# Patient Record
Sex: FEMALE | Race: WHITE | NOT HISPANIC OR LATINO | URBAN - METROPOLITAN AREA
[De-identification: names, ages, dates, MRNs, and addresses within clinical notes are randomized per-mention and may not be internally consistent; named-entity substitution may affect disease eponyms.]

---

## 2024-08-13 ENCOUNTER — OFFICE VISIT (OUTPATIENT)
Dept: CARDIOLOGY | Facility: HOSPITAL | Age: 24
End: 2024-08-13
Payer: COMMERCIAL

## 2024-08-13 VITALS
HEART RATE: 72 BPM | OXYGEN SATURATION: 100 % | DIASTOLIC BLOOD PRESSURE: 81 MMHG | WEIGHT: 166.23 LBS | SYSTOLIC BLOOD PRESSURE: 122 MMHG | BODY MASS INDEX: 27.66 KG/M2

## 2024-08-13 DIAGNOSIS — R00.2 PALPITATIONS: ICD-10-CM

## 2024-08-13 DIAGNOSIS — E78.5 HYPERLIPIDEMIA, UNSPECIFIED HYPERLIPIDEMIA TYPE: Primary | ICD-10-CM

## 2024-08-13 LAB
ATRIAL RATE: 74 BPM
P AXIS: 84 DEGREES
P OFFSET: 197 MS
P ONSET: 150 MS
PR INTERVAL: 142 MS
Q ONSET: 221 MS
QRS COUNT: 13 BEATS
QRS DURATION: 86 MS
QT INTERVAL: 368 MS
QTC CALCULATION(BAZETT): 408 MS
QTC FREDERICIA: 394 MS
R AXIS: 82 DEGREES
T AXIS: 60 DEGREES
T OFFSET: 405 MS
VENTRICULAR RATE: 74 BPM

## 2024-08-13 PROCEDURE — 99204 OFFICE O/P NEW MOD 45 MIN: CPT | Performed by: NURSE PRACTITIONER

## 2024-08-13 PROCEDURE — 99214 OFFICE O/P EST MOD 30 MIN: CPT | Performed by: NURSE PRACTITIONER

## 2024-08-13 PROCEDURE — 93005 ELECTROCARDIOGRAM TRACING: CPT | Performed by: NURSE PRACTITIONER

## 2024-08-14 NOTE — PROGRESS NOTES
Referred by Dr. Celaya for palpitations and h/o dyslipidemia      History Of Present Illness:    Kristina Castillo is a 24 y.o. female with no significant past medical history presenting to the [facility] for evaluation of palpitations as well as evaluation of hyperlipidemia.  She reports having intermittent palpitations for many years.  Feels as if her heart stops for a second and restarts.  She denies associated dizziness, SOB, chest pain.  Symptoms occur once or twice weekly and last for a few seconds.  Was diagnosed with hyperlipidemia a few years ago-- was taking statin therapy but stopped.  Lipid panel 9/7/2021-- total 211, HDL 70.6, , Trig 130.       Past Medical History:  She has a past medical history of Persons encountering health services in other specified circumstances.    Past Surgical History:  She has a past surgical history that includes Other surgical history (12/17/2020).      Social History:  She has no history on file for tobacco use, alcohol use, and drug use.    Family History:  No family history on file.     Allergies:  Penicillins    Outpatient Medications:  No current outpatient medications     Last Recorded Vitals:  Vitals:    08/13/24 1451   BP: 122/81   Pulse: 72   SpO2: 100%   Weight: 75.4 kg (166 lb 3.6 oz)       Physical Exam:  Constitutional: Pleasant, Awake/Alert/Oriented to person place and time. No distress  Head: Atraumatic, Normocephalic  Eyes: EOMI. ELVIA  Neck: No JVD  Cardiovascular: Regular rate and rhythm, S1, S2. No extra heart sounds or murmurs  Respiratory: Clear to auscultation bilaterally. No wheezing, rales or rhonchi. Good chest wall expansion  Abdomen: Soft, Nontender. Bowel sounds appreciated  Musculoskeletal: ROM intact. Muscle strength grossly intact upper and lower extremities 5/5.   Neurological: CNII-XII intact. Sensation grossly intact  Extremities: Warm and dry. No acute rashes and lesions  Psychiatric: Appropriate mood  "and affect         Last Labs:  CBC -  Lab Results   Component Value Date    WBC 5.9 09/07/2021    HGB 13.1 09/07/2021    HCT 40.3 09/07/2021    MCV 90 09/07/2021     09/07/2021       CMP -  Lab Results   Component Value Date    CALCIUM 9.4 09/07/2021    PROT 6.8 09/07/2021    ALBUMIN 4.4 09/07/2021    AST 16 09/07/2021    ALT 15 09/07/2021    ALKPHOS 60 09/07/2021    BILITOT 0.4 09/07/2021       LIPID PANEL -   Lab Results   Component Value Date    CHOL 211 (H) 09/07/2021    TRIG 130 09/07/2021    HDL 70.6 09/07/2021    CHHDL 3.0 09/07/2021    LDLF 114 09/07/2021    VLDL 26 09/07/2021    NHDL 140 09/07/2021       RENAL FUNCTION PANEL -   Lab Results   Component Value Date    GLUCOSE 87 09/07/2021     09/07/2021    K 4.8 09/07/2021     09/07/2021    CO2 24 09/07/2021    ANIONGAP 13 09/07/2021    BUN 9 09/07/2021    CREATININE 0.66 09/07/2021    CALCIUM 9.4 09/07/2021    ALBUMIN 4.4 09/07/2021        No results found for: \"BNP\", \"HGBA1C\"    Last Cardiology Tests:  ECG:  NSR, HR 74bpm     Lab review: I have personally reviewed the laboratory result(s)   Diagnostic review: I have personally reviewed the result(s) of the EKG .    Assessment/Plan   Very pleasant 24 y.o. female with no significant past medical history presenting to the [facility] for evaluation of palpitations as well as evaluation of hyperlipidemia.      Plan:  -Recommend obtaining further cardiac ambulatory monitor for evaluation of palpitations.  Suspect infrequent PVCs or PACs. Could consider starting low dose BB or CCB pending zio results    -Will check current lipid panel-- (2021 HDL 70.6, )      TIGRE Mitchell-CNP  "

## 2024-09-03 ENCOUNTER — HOSPITAL ENCOUNTER (OUTPATIENT)
Dept: CARDIOLOGY | Facility: CLINIC | Age: 24
Discharge: HOME | End: 2024-09-03
Payer: COMMERCIAL

## 2024-09-03 DIAGNOSIS — R00.2 PALPITATIONS: ICD-10-CM

## 2025-03-27 ENCOUNTER — APPOINTMENT (OUTPATIENT)
Dept: URGENT CARE | Age: 25
End: 2025-03-27
Payer: COMMERCIAL

## 2025-05-15 ENCOUNTER — APPOINTMENT (OUTPATIENT)
Dept: OBSTETRICS AND GYNECOLOGY | Facility: CLINIC | Age: 25
End: 2025-05-15
Payer: COMMERCIAL

## 2025-05-21 ENCOUNTER — APPOINTMENT (OUTPATIENT)
Dept: OBSTETRICS AND GYNECOLOGY | Facility: CLINIC | Age: 25
End: 2025-05-21
Payer: COMMERCIAL

## 2025-06-04 ENCOUNTER — OFFICE VISIT (OUTPATIENT)
Dept: OBSTETRICS AND GYNECOLOGY | Facility: CLINIC | Age: 25
End: 2025-06-04
Payer: COMMERCIAL

## 2025-06-04 VITALS
BODY MASS INDEX: 25.56 KG/M2 | DIASTOLIC BLOOD PRESSURE: 80 MMHG | SYSTOLIC BLOOD PRESSURE: 124 MMHG | HEIGHT: 65 IN | WEIGHT: 153.4 LBS | HEART RATE: 76 BPM

## 2025-06-04 DIAGNOSIS — Z12.4 CERVICAL CANCER SCREENING: ICD-10-CM

## 2025-06-04 DIAGNOSIS — Z01.419 WELL WOMAN EXAM: Primary | ICD-10-CM

## 2025-06-04 PROCEDURE — 87491 CHLMYD TRACH DNA AMP PROBE: CPT | Performed by: NURSE PRACTITIONER

## 2025-06-04 PROCEDURE — 99395 PREV VISIT EST AGE 18-39: CPT | Performed by: NURSE PRACTITIONER

## 2025-06-04 PROCEDURE — 3008F BODY MASS INDEX DOCD: CPT | Performed by: NURSE PRACTITIONER

## 2025-06-04 PROCEDURE — 99385 PREV VISIT NEW AGE 18-39: CPT | Performed by: NURSE PRACTITIONER

## 2025-06-04 PROCEDURE — 88175 CYTOPATH C/V AUTO FLUID REDO: CPT | Mod: TC,GCY | Performed by: NURSE PRACTITIONER

## 2025-06-04 PROCEDURE — 88141 CYTOPATH C/V INTERPRET: CPT | Performed by: STUDENT IN AN ORGANIZED HEALTH CARE EDUCATION/TRAINING PROGRAM

## 2025-06-04 RX ORDER — ERGOCALCIFEROL 1.25 MG/1
1 CAPSULE ORAL
COMMUNITY
Start: 2021-09-16

## 2025-06-04 RX ORDER — MAGNESIUM 200 MG
TABLET ORAL
COMMUNITY
Start: 2021-09-16

## 2025-06-04 RX ORDER — ETONOGESTREL AND ETHINYL ESTRADIOL VAGINAL RING .015; .12 MG/D; MG/D
RING VAGINAL
COMMUNITY
Start: 2021-03-02

## 2025-06-04 ASSESSMENT — ENCOUNTER SYMPTOMS
RESPIRATORY NEGATIVE: 1
PSYCHIATRIC NEGATIVE: 1
EYES NEGATIVE: 1
CONSTITUTIONAL NEGATIVE: 1
NEUROLOGICAL NEGATIVE: 1
ENDOCRINE NEGATIVE: 1
HEMATOLOGIC/LYMPHATIC NEGATIVE: 1
ALLERGIC/IMMUNOLOGIC NEGATIVE: 1
CARDIOVASCULAR NEGATIVE: 1
GASTROINTESTINAL NEGATIVE: 1
MUSCULOSKELETAL NEGATIVE: 1

## 2025-06-04 ASSESSMENT — PAIN SCALES - GENERAL: PAINLEVEL_OUTOF10: 0-NO PAIN

## 2025-06-04 NOTE — PROGRESS NOTES
"Subjective   Kristina Castillo is a 25 y.o. female who is here as a new patient for a routine exam.     Current contraception: condoms  History of abnormal Pap smear: no  Family history of uterine or ovarian cancer: no  History of abnormal mammogram: no  Family history of breast cancer: no  History of abnormal lipids: no  Menstrual History:  OB History          0    Para   0    Term   0       0    AB   0    Living   0         SAB   0    IAB   0    Ectopic   0    Multiple   0    Live Births   0                Patient's LMP was 2025.    HPI  Patient seeks STD testing for reassurance d/t her partner's sx, which are now suspected to be E. Coli. She has been with this partner since January, and a previous partner of 5.5 years which ended a year ago. She has a hx of a previous partner possibly transmitting herpes through oral sex, but has not had any outbreaks. Reports normal monthly menstrual periods. Hx of NuvaRing use in  which has been discontinued, and is now using condoms occasionally. Has discontinued hormonal BC d/t causing irregular periods and blood clots. Hx of ruptured ovarian cysts. Periods are currently under control, with heavy bleeding on the first day, then 4 days of bleeding. Denies pain with intercourse, but had it in the past when ovarian cysts were present. Denies vaginal pain or burning.   Occupational Therapist Assistant    Records Review  -  pap smear      Review of Systems   Constitutional: Negative.    HENT: Negative.     Eyes: Negative.    Respiratory: Negative.     Cardiovascular: Negative.    Gastrointestinal: Negative.    Endocrine: Negative.    Genitourinary: Negative.    Musculoskeletal: Negative.    Skin: Negative.    Allergic/Immunologic: Negative.    Neurological: Negative.    Hematological: Negative.    Psychiatric/Behavioral: Negative.         Objective   /80 (Patient Position: Sitting)   Pulse 76   Ht 1.651 m (5' 5\")   Wt 69.6 kg (153 lb 6.4 oz)   " LMP 05/24/2025   BMI 25.53 kg/m²     Physical Exam  Constitutional:       Appearance: Normal appearance.   Genitourinary:      Vulva, bladder and urethral meatus normal.      Right Labia: No tenderness.     Left Labia: No tenderness.     No vaginal tenderness.        Right Adnexa: no mass present.     Left Adnexa: no mass present.     No cervical motion tenderness.      Uterus is not enlarged or fixed.      Pelvic exam was performed with patient in the lithotomy position.   Breasts:     Right: Normal. No mass.      Left: No mass.   HENT:      Head: Normocephalic and atraumatic.   Neurological:      General: No focal deficit present.      Mental Status: She is alert and oriented to person, place, and time.   Psychiatric:         Mood and Affect: Mood normal.         Behavior: Behavior normal.         Thought Content: Thought content normal.         Judgment: Judgment normal.         Assessment/Plan   25 y.o. female presents for cervical cancer screening and well woman exam.     Diagnosis List:  #1 Cervical cancer screening  #2 Well woman exam    Plan:  1. Cervical cancer screening  - Speculum exam performed and pap collected today with adequate sample size.   - We will call the patient in 2+ weeks with the cytology report if results are abnormal and discussed that results will be uploaded to the patient portal to review after they have been resulted by the pathologist.   - Educated on the importance of pap smears every 3-5 years, and yearly interim pelvic exams.  - STD testing including gonorrhea, chlamydia, and trichomonas added to pap smear; awaiting results. Patient declines blood testing at this time. Monitor and report any herpes outbreaks.     2. Well woman exam  - Pelvic and breast exams performed; unremarkable findings.   - Follow up in Urogynecology.     Follow up in 1 year with TIGRE Park-CNP.      Scribe Attestation  By signing my name below, IPriscila Scribe, attest that this  documentation has been prepared under the direction and in the presence of ROXY Park on 06/04/2025 at 5:06 PM.     SPEKE audio duration: 16 minutes    I spent a total of 21 minutes in face to face and non face to face time.   I, ROXY Park, personally performed the services described in this documentation which was scribed virtually and I confirm that it is both accurate and complete.

## 2025-06-05 LAB
C TRACH RRNA SPEC QL NAA+PROBE: NEGATIVE
N GONORRHOEA DNA SPEC QL PROBE+SIG AMP: NEGATIVE

## 2025-06-30 LAB
CYTOLOGY CMNT CVX/VAG CYTO-IMP: NORMAL
LAB AP CONTRACEPTIVE HISTORY: NORMAL
LAB AP HPV GENOTYPE QUESTION: YES
LAB AP HPV HR: NORMAL
LAB AP PAP ADDITIONAL TESTS: NORMAL
LABORATORY COMMENT REPORT: NORMAL
LMP START DATE: NORMAL
PATH REPORT.TOTAL CANCER: NORMAL

## 2025-07-15 ENCOUNTER — TELEPHONE (OUTPATIENT)
Dept: OBSTETRICS AND GYNECOLOGY | Facility: CLINIC | Age: 25
End: 2025-07-15
Payer: COMMERCIAL

## 2025-07-15 NOTE — TELEPHONE ENCOUNTER
Result Communication    Resulted Orders   THINPREP PAP TEST   Result Value Ref Range    Case Report       Gynecologic Cytology                              Case: [id number]                                   Authorizing Provider:  Zuri Calvin,         Collected:           06/04/2025 1342                                     APRN-CNP                                                                     Ordering Location:     [facility]   Received:            06/04/2025 [facility]                                     [facility]                                                                First Screen:          TONYA Alejandro                                                               Pathologist:           Mirta Snow MD                                                          Specimen:    ThinPrep Liquid-Based Pap-Imaging System Screen, CERVIX, SCREENING                         Final Cytological Interpretation       Squamous and/or Glandular Abnormality    A. THINPREP PAP CERVIX, SCREENING -     Specimen Adequacy  Satisfactory for evaluation; endocervical/transformation zone component is present    General Categorization  Epithelial cell abnormality- squamous cell, see interpretation.    Descriptive Interpretation  Low grade squamous intraepithelial lesion (LSIL) - Cervix      Specimen does not meet the requisition-stated criteria for HPV testing. See Pap test interpretation above.                Slide(s) initially screened by TONYA Alejandro at [facility] [address] [facility] [id number]  By the signature on this report, the individual or group listed as making the Final Interpretation/Diagnosis certifies that they have reviewed this case.       ThinPrep Imaging System       This specimen has been analyzed by the ThinPrep Imaging System ([employer] an automated imaging and review system, which assists the laboratory in evaluating cells on ThinPrep Pap tests. Following  automated imaging, selected fields from every slide were reviewed by a cytotechnologist and/or pathologist.        Educational Note       Cervical cytology is a screening procedure primarily for squamous cancers and precursors and has associated false-negative and false-positives results as evidenced by published data. Your patient's test should be interpreted in this context, together with the patient's history and clinical findings. Regular sampling and follow-up of unexplained clinical signs and symptoms are recommended to minimize false negative results.      Perform HPV HR test? Reflex if ASCUS only     Include HPV Genotype? Yes     Additional Testing: Chlamydia + Gonorrhea     LMP 5/24/2025     Contraceptive History       Condom     C. trachomatis / N. gonorrhoeae, Amplified, Urogenital   Result Value Ref Range    Neisseria gonorrhea,Amplified Negative Negative    Chlamydia trachomatis, Amplified Negative Negative    Narrative    The APTIMA Combo 2 assay is FDA-approved NAAT using target capture for the in vitro qualitative detection and differentiation of ribosomal RNA (rRNA) for Chlamydia trachomatis and Neisseria gonorrhoeae testing on clinician-collected endocervical, PreservCyt solution liquid Pap specimens, vaginal, throat, rectal, and male urethral swab specimens; patient-collected vaginal swab specimens, and female and male urine specimens from symptomatic and asymptomatic individuals. Samples from all other sites are not validated for this method.       3:16 PM      Results were successfully communicated with the patient and they acknowledged their understanding. Gave pt phone number to call for follow-up colposcopy. All questions answered, & Kristina verbalized understanding.

## 2025-07-15 NOTE — TELEPHONE ENCOUNTER
Called pt to relay PAP results, but she requested a call tomorrow as she wasn't able to talk today.

## 2025-07-15 NOTE — TELEPHONE ENCOUNTER
----- Message from Zuri Calvin sent at 7/8/2025  4:31 PM EDT -----  LSIL pap, needs a colposcopy with GYN provider.  Please call and explain.   ----- Message -----  From: Lab, Background User  Sent: 6/5/2025   3:55 PM EDT  To: Zuri Calvin, TIGRE-CNP